# Patient Record
Sex: FEMALE | Race: BLACK OR AFRICAN AMERICAN | Employment: STUDENT | URBAN - METROPOLITAN AREA
[De-identification: names, ages, dates, MRNs, and addresses within clinical notes are randomized per-mention and may not be internally consistent; named-entity substitution may affect disease eponyms.]

---

## 2017-05-09 ENCOUNTER — CLINICAL SUPPORT (OUTPATIENT)
Dept: FAMILY MEDICINE CLINIC | Age: 22
End: 2017-05-09

## 2017-05-09 VITALS — TEMPERATURE: 98.3 F

## 2017-05-09 DIAGNOSIS — Z23 ENCOUNTER FOR IMMUNIZATION: Primary | ICD-10-CM

## 2017-05-09 NOTE — MR AVS SNAPSHOT
Visit Information Date & Time Provider Department Dept. Phone Encounter #  
 5/9/2017 10:15 AM Melva Hutchins MD [address] [id number] Upcoming Health Maintenance Date Due  
 HPV AGE 9Y-34Y (1 of 3 - Female 3 Dose Series) 2/10/2005 PAP AKA CERVICAL CYTOLOGY 2/10/2015 INFLUENZA AGE 9 TO ADULT 8/1/2017 DTaP/Tdap/Td series (8 - Td) 8/5/2026 Allergies as of 5/9/2017  Review Complete On: 5/4/5546 By: Shanthi Forrest. Florencia Adams LPN No Known Allergies Current Immunizations  Reviewed on 5/9/2017 Name Date DTaP 4/24/2000, 9/3/1995, 1994, 1994, 1994 Hep B Vaccine 3/6/1996, 1994, 1994 Hep B Vaccine (Adult) 5/9/2017 10:05 AM, 12/9/2016, 11/7/2016 Hib 9/3/1995, 1994, 1994, 1994 Influenza Vaccine (Quad) PF 10/6/2016 MMR 11/7/2016, 4/24/2000, 9/3/1995 Meningococcal Vaccine 6/12/2012 Poliovirus vaccine 9/3/1995, 1994, 1994, 1994 TB Skin Test (PPD) 5/6/2013, 6/12/2012, 9/3/1995 TB Skin Test (PPD) Intradermal 9/6/2016, 8/5/2016 Tdap 8/5/2016, 5/31/2006 Varicella Virus Vaccine 9/2/2016, 8/8/2012 Reviewed by Shanthi Forrest. Florencia Adams LPN on 5/0/5839 at 52:11 AM  
You Were Diagnosed With   
  
 Codes Comments Encounter for immunization    -  Primary ICD-10-CM: C33 ICD-9-CM: V03.89 Vitals Temp OB Status Smoking Status 98.3 °F (36.8 °C) (Oral) Having regular periods Never Smoker Preferred Pharmacy Pharmacy Name Phone [facility] [phone] Your Updated Medication List  
  
Notice  As of 5/9/2017  4:05 PM  
 You have not been prescribed any medications. We Performed the Following HEPATITIS B VACCINE, ADULT DOSAGE, IM [87180 CPT(R)] Patient Instructions Please contact our office if you have any questions about your visit today. Introducing [facility]! Edward Lee introduces tydy patient portal. Now you can access parts of your medical record, email your doctor's office, and request medication refills online. 1. In your internet browser, go to [email or web address] 2. Click on the First Time User? Click Here link in the Sign In box. You will see the New Member Sign Up page. 3. Enter your tydy Access Code exactly as it appears below. You will not need to use this code after youve completed the sign-up process. If you do not sign up before the expiration date, you must request a new code. · tydy Access Code: [email or web address] Expires: 8/7/2017 10:14 AM 
 
4. Enter the last four digits of your Social Security Number (xxxx) and Date of Birth (mm/dd/yyyy) as indicated and click Submit. You will be taken to the next sign-up page. 5. Create a tydy ID. This will be your tydy login ID and cannot be changed, so think of one that is secure and easy to remember. 6. Create a tydy password. You can change your password at any time. 7. Enter your Password Reset Question and Answer. This can be used at a later time if you forget your password. 8. Enter your e-mail address. You will receive e-mail notification when new information is available in [address] 9. Click Sign Up. You can now view and download portions of your medical record. 10. Click the Download Summary menu link to download a portable copy of your medical information. If you have questions, please visit the Frequently Asked Questions section of the tydy website. Remember, tydy is NOT to be used for urgent needs. For medical emergencies, dial 911. Now available from your iPhone and Android! Please provide this summary of care documentation to your next provider. Your primary care clinician is listed as [facility]. If you have any questions after today's visit, please call [phone].

## 2021-09-25 NOTE — PROGRESS NOTES
Chief Complaint   Patient presents with    Immunization/Injection     here for 3rd Hep B shot     1. Have you been to the ER, urgent care clinic since your last visit? Hospitalized since your last visit? No    2. Have you seen or consulted any other health care providers outside of the [address] [place] [address] since your last visit? Include any pap smears or colon screening. No    Carol Tran 1995 . female who presents for routine immunizations. She denies any symptoms , reactions or allergies that would exclude them from being immunized today. Risks and adverse reactions were discussed and the VIS was given to them. All questions were addressed. Order placed for Hepatitis B vaccine,  per Verbal Order from Dr Rebecca Mcginnis with read back. Patient was observed for 20 min post injection. There were no reactions observed. Shanthi Forrest.  Florencia Adams LPN Presents via medic c/o sob, wheezing and chest pain x 4 hours No